# Patient Record
Sex: FEMALE | Race: WHITE | NOT HISPANIC OR LATINO
[De-identification: names, ages, dates, MRNs, and addresses within clinical notes are randomized per-mention and may not be internally consistent; named-entity substitution may affect disease eponyms.]

---

## 2017-07-15 ENCOUNTER — APPOINTMENT (OUTPATIENT)
Dept: POPULATION HEALTH | Facility: CLINIC | Age: 22
End: 2017-07-15

## 2017-07-18 PROBLEM — Z00.00 ENCOUNTER FOR PREVENTIVE HEALTH EXAMINATION: Status: ACTIVE | Noted: 2017-07-18

## 2017-07-28 ENCOUNTER — APPOINTMENT (OUTPATIENT)
Dept: POPULATION HEALTH | Facility: CLINIC | Age: 22
End: 2017-07-28

## 2017-09-27 ENCOUNTER — APPOINTMENT (OUTPATIENT)
Dept: POPULATION HEALTH | Facility: CLINIC | Age: 22
End: 2017-09-27
Payer: COMMERCIAL

## 2017-09-27 PROCEDURE — G0008: CPT

## 2017-09-27 PROCEDURE — 90686 IIV4 VACC NO PRSV 0.5 ML IM: CPT

## 2018-09-08 ENCOUNTER — EMERGENCY (EMERGENCY)
Facility: HOSPITAL | Age: 23
LOS: 1 days | Discharge: HOME | End: 2018-09-08
Attending: EMERGENCY MEDICINE
Payer: COMMERCIAL

## 2018-09-08 VITALS
DIASTOLIC BLOOD PRESSURE: 86 MMHG | RESPIRATION RATE: 16 BRPM | HEIGHT: 64 IN | HEART RATE: 74 BPM | WEIGHT: 154.98 LBS | SYSTOLIC BLOOD PRESSURE: 143 MMHG | OXYGEN SATURATION: 98 % | TEMPERATURE: 98 F

## 2018-09-08 PROCEDURE — 99282 EMERGENCY DEPT VISIT SF MDM: CPT | Mod: 25

## 2018-09-08 PROCEDURE — 99282 EMERGENCY DEPT VISIT SF MDM: CPT

## 2018-09-08 NOTE — ED PROVIDER NOTE - OBJECTIVE STATEMENT
24 y/o F pt with no significant PMHx and no significant PSHx presents to ED with sister c/o HA x4-5days. Pt notes associated bump on the right side of the top of her head as well as some nausea. Pt describes HA as being inside-outside and intermittent. Pt states that she was driving during the initial onset of the headache. Pt reports that although she has had HA's in the past, the current HA does not feel similar to those. Pt reports HA as being severe yesterday and rates it a 10/10. Pt currently reports HA as being 5/10. Pt states that she took Motrin for the pain with no relief. Pt states that the HA gradually worsened over time with each episode. Pt reports that she is currently on birth control. Pt denies any Hx of smoking, falls, blurry vision, vomiting, drainage, shooting pain, or any other complaints. NKDA. 22 y/o F pt with no significant PMHx and no significant PSHx presents to ED with sister c/o HA x4-5days. Pt notes associated bump on the right side of the top of her head as well as some nausea. Pt describes HA as being R-sided intermittent, no associated exacerb/improving factors. Pt states that she was driving during the initial onset of the headache, no acute thunderclap onset, was gradually worsening over the day. Pt reports HA as being severe yesterday and rates it a 10/10, though now 5/10 and declines analgesia, is concerned primarily due to the bump on her head. Pt reports that she is currently on birth control. Pt denies any Hx of smoking, falls, blurry vision, vomiting, erythema or drainage of bump, or any other complaints. NKDA.

## 2018-09-08 NOTE — ED PROVIDER NOTE - PHYSICAL EXAMINATION
Afebrile, hemodynamically stable, saturating well  NAD, well appearing  Head NCAT, noted mild R skull prominence, differential includes anatomic vs sebaceous cyst, no erythema/fluctuance/induration  Neck supple, full ROM  PERRL, EOMI, anicteric  MMM  RRR, nml S1/S2, no m/r/g  Lungs CTAB, no w/r/r  Abd soft, NT, ND, nml BS, no rebound or guarding  AAO, CN's 3-12 grossly intact, motor 5/5 and sens symmetric in all extrems  LOPEZ spontaneously, no leg cyanosis or edema  Skin warm, well perfused, no rashes or hives

## 2018-09-08 NOTE — ED ADULT TRIAGE NOTE - CHIEF COMPLAINT QUOTE
pt c/o headaches x 1 week w/ nausea, denies injury,  fever, neck pain or photophobia, pt also noticed a lump on right side of her head that she is concerned about

## 2018-09-08 NOTE — ED PROVIDER NOTE - MEDICAL DECISION MAKING DETAILS
Neuro intact. Belkofski SAH rule negative. No meningeal signs or fever. No visual symptoms. Bump does not appear infected, may be anatomic vs. sebaceous cyst. Well appearing, hemodynamically stable. Declines analgesia. Discharged with need for derm f/u and return precautions.

## 2018-09-09 NOTE — ED ADULT NURSE NOTE - NSIMPLEMENTINTERV_GEN_ALL_ED
Implemented All Universal Safety Interventions:  [place] to call system. Call bell, personal items and telephone within reach. Instruct patient to call for assistance. Room bathroom lighting operational. Non-slip footwear when patient is off stretcher. Physically safe environment: no spills, clutter or unnecessary equipment. Stretcher in lowest position, wheels locked, appropriate side rails in place.

## 2020-01-29 ENCOUNTER — TRANSCRIPTION ENCOUNTER (OUTPATIENT)
Age: 25
End: 2020-01-29

## 2020-03-09 ENCOUNTER — TRANSCRIPTION ENCOUNTER (OUTPATIENT)
Age: 25
End: 2020-03-09

## 2020-04-26 ENCOUNTER — MESSAGE (OUTPATIENT)
Age: 25
End: 2020-04-26

## 2022-06-09 NOTE — ED PROVIDER NOTE - DATA REVIEWED, MDM
What Type Of Note Output Would You Prefer (Optional)?: Bullet Format How Severe Is Your Skin Lesion?: moderate Has Your Skin Lesion Been Treated?: not been treated Is This A New Presentation, Or A Follow-Up?: Skin Lesions nurses notes/vital signs